# Patient Record
Sex: FEMALE | Race: OTHER | NOT HISPANIC OR LATINO
[De-identification: names, ages, dates, MRNs, and addresses within clinical notes are randomized per-mention and may not be internally consistent; named-entity substitution may affect disease eponyms.]

---

## 2019-06-25 ENCOUNTER — APPOINTMENT (OUTPATIENT)
Dept: OBGYN | Facility: CLINIC | Age: 44
End: 2019-06-25
Payer: COMMERCIAL

## 2019-06-25 VITALS
DIASTOLIC BLOOD PRESSURE: 80 MMHG | HEIGHT: 62 IN | SYSTOLIC BLOOD PRESSURE: 116 MMHG | WEIGHT: 155 LBS | BODY MASS INDEX: 28.52 KG/M2

## 2019-06-25 DIAGNOSIS — Z87.09 PERSONAL HISTORY OF OTHER DISEASES OF THE RESPIRATORY SYSTEM: ICD-10-CM

## 2019-06-25 DIAGNOSIS — O09.529 SUPERVISION OF ELDERLY MULTIGRAVIDA, UNSPECIFIED TRIMESTER: ICD-10-CM

## 2019-06-25 DIAGNOSIS — Z00.00 ENCOUNTER FOR GENERAL ADULT MEDICAL EXAMINATION W/OUT ABNORMAL FINDINGS: ICD-10-CM

## 2019-06-25 LAB — HCG UR QL: POSITIVE

## 2019-06-25 PROCEDURE — 76830 TRANSVAGINAL US NON-OB: CPT

## 2019-06-25 PROCEDURE — 99203 OFFICE O/P NEW LOW 30 MIN: CPT | Mod: 25

## 2019-06-25 PROCEDURE — 81025 URINE PREGNANCY TEST: CPT

## 2019-06-25 NOTE — PHYSICAL EXAM
[Awake] : awake [Alert] : alert [Soft] : soft [Oriented x3] : oriented to person, place, and time [Normal] : cervix [No Bleeding] : there was no active vaginal bleeding [Anteversion] : anteverted [Uterine Adnexae] : were not tender and not enlarged [Enlarged ___ wks] : enlarged [unfilled] ~Uweeks [Acute Distress] : no acute distress [Nipple Discharge] : no nipple discharge [Mass] : no breast mass [Axillary LAD] : no axillary lymphadenopathy [Tender] : non tender [Tenderness] : nontender

## 2019-07-09 ENCOUNTER — APPOINTMENT (OUTPATIENT)
Dept: OBGYN | Facility: CLINIC | Age: 44
End: 2019-07-09
Payer: COMMERCIAL

## 2019-07-17 ENCOUNTER — APPOINTMENT (OUTPATIENT)
Dept: OBGYN | Facility: CLINIC | Age: 44
End: 2019-07-17
Payer: COMMERCIAL

## 2019-07-17 VITALS — SYSTOLIC BLOOD PRESSURE: 118 MMHG | BODY MASS INDEX: 28.53 KG/M2 | WEIGHT: 156 LBS | DIASTOLIC BLOOD PRESSURE: 74 MMHG

## 2019-07-17 DIAGNOSIS — N94.6 DYSMENORRHEA, UNSPECIFIED: ICD-10-CM

## 2019-07-17 DIAGNOSIS — Z30.09 ENCOUNTER FOR OTHER GENERAL COUNSELING AND ADVICE ON CONTRACEPTION: ICD-10-CM

## 2019-07-17 DIAGNOSIS — N92.0 EXCESSIVE AND FREQUENT MENSTRUATION WITH REGULAR CYCLE: ICD-10-CM

## 2019-07-17 PROCEDURE — 99214 OFFICE O/P EST MOD 30 MIN: CPT | Mod: 25

## 2019-07-17 PROCEDURE — 76830 TRANSVAGINAL US NON-OB: CPT

## 2019-07-17 NOTE — PROCEDURE
[Amenorrhea] : Amenorrhea [Transvaginal Ultrasound] : transvaginal ultrasound [Present] : uterus present [Anteverted] : anteverted [L: ___ cm] : L: [unfilled] cm [W: ___cm] : W: [unfilled] cm [FreeTextEntry3] : Followup to ETOP endometrial appears to have scant amount of fluid present [FreeTextEntry7] : 2.2x3.6cm [FreeTextEntry8] : 2.2x3.1cm [FreeTextEntry4] : Normal appearing transvaginal sono status post ETOP

## 2019-07-25 ENCOUNTER — RX RENEWAL (OUTPATIENT)
Age: 44
End: 2019-07-25

## 2019-07-25 RX ORDER — LEVONORGESTREL 52 MG/1
20 INTRAUTERINE DEVICE INTRAUTERINE
Qty: 1 | Refills: 0 | Status: ACTIVE | COMMUNITY
Start: 2019-07-25 | End: 1900-01-01

## 2019-08-15 ENCOUNTER — APPOINTMENT (OUTPATIENT)
Dept: OBGYN | Facility: CLINIC | Age: 44
End: 2019-08-15
Payer: COMMERCIAL

## 2019-08-15 VITALS — BODY MASS INDEX: 29.26 KG/M2 | WEIGHT: 160 LBS | SYSTOLIC BLOOD PRESSURE: 120 MMHG | DIASTOLIC BLOOD PRESSURE: 72 MMHG

## 2019-08-15 DIAGNOSIS — Z87.42 PERSONAL HISTORY OF OTHER DISEASES OF THE FEMALE GENITAL TRACT: ICD-10-CM

## 2019-08-15 DIAGNOSIS — Z32.01 ENCOUNTER FOR PREGNANCY TEST, RESULT POSITIVE: ICD-10-CM

## 2019-08-15 DIAGNOSIS — N92.6 IRREGULAR MENSTRUATION, UNSPECIFIED: ICD-10-CM

## 2019-08-15 LAB — HCG UR QL: POSITIVE

## 2019-08-15 PROCEDURE — 76830 TRANSVAGINAL US NON-OB: CPT

## 2019-08-15 PROCEDURE — 81025 URINE PREGNANCY TEST: CPT

## 2019-08-15 PROCEDURE — 99214 OFFICE O/P EST MOD 30 MIN: CPT | Mod: 25

## 2019-08-15 NOTE — PROCEDURE
[Abnormal Uterine Bleeding] : abnormal uterine bleeding [Threatened Miscarriage] : threatened miscarriage [Transvaginal Ultrasound] : transvaginal ultrasound [Anteverted] : anteverted [Present] : uterus present [L: ___ cm] : L: [unfilled] cm [FreeTextEntry5] : Findings noted approximately 1.5 cm with a small central hypolucency [W: ___cm] : W: [unfilled] cm [FreeTextEntry7] : 2.0 x 3.6 cm [FreeTextEntry8] : 1.9 x 3.4 cm [FreeTextEntry4] : In view of slight positive urine pregnancy test cannot rule out early pregnancy, versus retained products of conception, versus H.mole.

## 2019-09-04 ENCOUNTER — APPOINTMENT (OUTPATIENT)
Dept: OBGYN | Facility: CLINIC | Age: 44
End: 2019-09-04
Payer: COMMERCIAL

## 2019-09-04 VITALS — BODY MASS INDEX: 29.44 KG/M2 | WEIGHT: 160 LBS | HEIGHT: 62 IN

## 2019-09-04 DIAGNOSIS — Z32.01 ENCOUNTER FOR PREGNANCY TEST, RESULT POSITIVE: ICD-10-CM

## 2019-09-04 PROCEDURE — 99214 OFFICE O/P EST MOD 30 MIN: CPT

## 2024-12-21 ENCOUNTER — EMERGENCY (EMERGENCY)
Facility: HOSPITAL | Age: 49
LOS: 0 days | Discharge: HOME | End: 2024-12-21
Attending: STUDENT IN AN ORGANIZED HEALTH CARE EDUCATION/TRAINING PROGRAM
Payer: COMMERCIAL

## 2024-12-21 VITALS
HEART RATE: 63 BPM | DIASTOLIC BLOOD PRESSURE: 55 MMHG | WEIGHT: 154.98 LBS | OXYGEN SATURATION: 99 % | RESPIRATION RATE: 18 BRPM | SYSTOLIC BLOOD PRESSURE: 90 MMHG | TEMPERATURE: 98 F

## 2024-12-21 VITALS — SYSTOLIC BLOOD PRESSURE: 102 MMHG | DIASTOLIC BLOOD PRESSURE: 56 MMHG | TEMPERATURE: 98 F | HEART RATE: 81 BPM

## 2024-12-21 DIAGNOSIS — J45.909 UNSPECIFIED ASTHMA, UNCOMPLICATED: ICD-10-CM
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
## 2024-12-21 DIAGNOSIS — R11.0 NAUSEA: ICD-10-CM

## 2024-12-21 DIAGNOSIS — S01.81XA LACERATION WITHOUT FOREIGN BODY OF OTHER PART OF HEAD, INITIAL ENCOUNTER: ICD-10-CM

## 2024-12-21 DIAGNOSIS — R42 DIZZINESS AND GIDDINESS: ICD-10-CM
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
## 2024-12-21 LAB
ANION GAP SERPL CALC-SCNC: 10 MMOL/L — SIGNIFICANT CHANGE UP (ref 7–14)
BASOPHILS # BLD AUTO: 0.04 K/UL — SIGNIFICANT CHANGE UP (ref 0–0.2)
BASOPHILS NFR BLD AUTO: 0.6 % — SIGNIFICANT CHANGE UP (ref 0–1)
BUN SERPL-MCNC: 8 MG/DL — LOW (ref 10–20)
CALCIUM SERPL-MCNC: 8.9 MG/DL — SIGNIFICANT CHANGE UP (ref 8.4–10.5)
CHLORIDE SERPL-SCNC: 105 MMOL/L — SIGNIFICANT CHANGE UP (ref 98–110)
CO2 SERPL-SCNC: 26 MMOL/L — SIGNIFICANT CHANGE UP (ref 17–32)
CREAT SERPL-MCNC: 0.8 MG/DL — SIGNIFICANT CHANGE UP (ref 0.7–1.5)
EGFR: 91 ML/MIN/1.73M2 — SIGNIFICANT CHANGE UP
EOSINOPHIL # BLD AUTO: 0.17 K/UL — SIGNIFICANT CHANGE UP (ref 0–0.7)
EOSINOPHIL NFR BLD AUTO: 2.4 % — SIGNIFICANT CHANGE UP (ref 0–8)
GLUCOSE SERPL-MCNC: 118 MG/DL — HIGH (ref 70–99)
HCG SERPL QL: NEGATIVE — SIGNIFICANT CHANGE UP
HCT VFR BLD CALC: 38.1 % — SIGNIFICANT CHANGE UP (ref 37–47)
HGB BLD-MCNC: 12.7 G/DL — SIGNIFICANT CHANGE UP (ref 12–16)
IMM GRANULOCYTES NFR BLD AUTO: 0.4 % — HIGH (ref 0.1–0.3)
LYMPHOCYTES # BLD AUTO: 1.51 K/UL — SIGNIFICANT CHANGE UP (ref 1.2–3.4)
LYMPHOCYTES # BLD AUTO: 21.3 % — SIGNIFICANT CHANGE UP (ref 20.5–51.1)
MCHC RBC-ENTMCNC: 28.8 PG — SIGNIFICANT CHANGE UP (ref 27–31)
MCHC RBC-ENTMCNC: 33.3 G/DL — SIGNIFICANT CHANGE UP (ref 32–37)
MCV RBC AUTO: 86.4 FL — SIGNIFICANT CHANGE UP (ref 81–99)
MONOCYTES # BLD AUTO: 0.44 K/UL — SIGNIFICANT CHANGE UP (ref 0.1–0.6)
MONOCYTES NFR BLD AUTO: 6.2 % — SIGNIFICANT CHANGE UP (ref 1.7–9.3)
NEUTROPHILS # BLD AUTO: 4.89 K/UL — SIGNIFICANT CHANGE UP (ref 1.4–6.5)
NEUTROPHILS NFR BLD AUTO: 69.1 % — SIGNIFICANT CHANGE UP (ref 42.2–75.2)
NRBC # BLD: 0 /100 WBCS — SIGNIFICANT CHANGE UP (ref 0–0)
PLATELET # BLD AUTO: 217 K/UL — SIGNIFICANT CHANGE UP (ref 130–400)
PMV BLD: 11.8 FL — HIGH (ref 7.4–10.4)
POTASSIUM SERPL-MCNC: 3.9 MMOL/L — SIGNIFICANT CHANGE UP (ref 3.5–5)
POTASSIUM SERPL-SCNC: 3.9 MMOL/L — SIGNIFICANT CHANGE UP (ref 3.5–5)
RBC # BLD: 4.41 M/UL — SIGNIFICANT CHANGE UP (ref 4.2–5.4)
RBC # FLD: 11.6 % — SIGNIFICANT CHANGE UP (ref 11.5–14.5)
SODIUM SERPL-SCNC: 141 MMOL/L — SIGNIFICANT CHANGE UP (ref 135–146)
WBC # BLD: 7.08 K/UL — SIGNIFICANT CHANGE UP (ref 4.8–10.8)
WBC # FLD AUTO: 7.08 K/UL — SIGNIFICANT CHANGE UP (ref 4.8–10.8)

## 2024-12-21 RX ORDER — ONDANSETRON HYDROCHLORIDE 4 MG/1
4 TABLET, FILM COATED ORAL ONCE
Refills: 0 | Status: COMPLETED | OUTPATIENT
Start: 2024-12-21 | End: 2024-12-21

## 2024-12-21 RX ORDER — SODIUM CHLORIDE 9 MG/ML
1000 INJECTION, SOLUTION INTRAMUSCULAR; INTRAVENOUS; SUBCUTANEOUS ONCE
Refills: 0 | Status: COMPLETED | OUTPATIENT
Start: 2024-12-21 | End: 2024-12-21

## 2024-12-21 RX ORDER — ACETAMINOPHEN 500MG 500 MG/1
650 TABLET, COATED ORAL ONCE
Refills: 0 | Status: COMPLETED | OUTPATIENT
Start: 2024-12-21 | End: 2024-12-21

## 2024-12-21 RX ADMIN — ONDANSETRON HYDROCHLORIDE 4 MILLIGRAM(S): 4 TABLET, FILM COATED ORAL at 08:46

## 2024-12-21 RX ADMIN — SODIUM CHLORIDE 2000 MILLILITER(S): 9 INJECTION, SOLUTION INTRAMUSCULAR; INTRAVENOUS; SUBCUTANEOUS at 08:46

## 2024-12-21 RX ADMIN — ACETAMINOPHEN 500MG 650 MILLIGRAM(S): 500 TABLET, COATED ORAL at 08:47

## 2024-12-21 NOTE — ED PROVIDER NOTE - PATIENT PORTAL LINK FT
You can access the FollowMyHealth Patient Portal offered by [facility] by registering at the following website: [email or web address]. By joining [facility]’s FollowMyHealth portal, you will also be able to view your health information using other applications (apps) compatible with our system.

## 2024-12-21 NOTE — ED PROVIDER NOTE - OBJECTIVE STATEMENT
POt is a 48y/o female no pmhx here for forehead lac from mechanical slip and fall in bathroom when getting out of the shower. Pt denies LOC, weakness, numbness, neck pain, AC use.

## 2024-12-21 NOTE — ED PROVIDER NOTE - ATTENDING APP SHARED VISIT CONTRIBUTION OF CARE
49-year-old female history of mild asthma  Patient for evaluation of fall.  Patient had a shower and stepped on Tylenol.  Patient states she hit her head on the door.   heard the injury and responded immediately.  Patient Dors is mild lightheadedness when she got up and was walking around some nausea now.  No vomiting.  Patient not on antiplatelets or anticoagulants.  No chest pain, neck pain, abdominal pain.  No feeling of fractured tooth. Last tetanus shot was 1 year ago. Patient did not have breakfast today but was eating normally yesterday    vs soft bp  gen- NAD, aaox3  card-rrr  lungs-ctab, no wheezing or rhonchi  abd-sntnd, no guarding or rebound  neuro- full str/sensation, cn ii-xii grossly intact, normal coordination    hent- ~2.5 inch diagonal vertical lac to forehead, FROM to jaw, no orbital wall tenderness, no fractured teeth,  mild R upper lip swelling and small contussion w/o deep wound or bleeding  Spine- no midline c/t/l spine ttp, neck supple, FROM to neck      Plan for screening labs, supportive care with hydration and symptom control, Fingerstick, EKG, CT head, wound repair

## 2024-12-21 NOTE — ED PROVIDER NOTE - CLINICAL SUMMARY MEDICAL DECISION MAKING FREE TEXT BOX
Throughout ED observation period, pt remained clinically   stable.  lightheadedness improving w/ hydration  Lab results as interpreted by me- no anemia, no significant electrolyte abnormalities, normal renal function  cth w/o acute findings  Laceration explored, irrigated and repaired as per procedure note.  Home care and f/u instructions for removal discussed as well as return precautions (increasing pain, redness, pus, swelling, etc)

## 2024-12-21 NOTE — ED PROVIDER NOTE - PHYSICAL EXAMINATION
VITAL SIGNS: I have reviewed nursing notes and confirm.  CONSTITUTIONAL: Well-developed; well-nourished  SKIN: deep 3cm, forehead lac   HEAD: Normocephalic; atraumatic.  EYES: conjunctiva and sclera clear.  ENT: No nasal discharge; airway clear.  EXT: Normal ROM.  No clubbing, cyanosis or edema  NEURO: Alert, oriented, grossly unremarkable

## 2024-12-26 ENCOUNTER — EMERGENCY (EMERGENCY)
Facility: HOSPITAL | Age: 49
LOS: 0 days | Discharge: HOME | End: 2024-12-26
Attending: EMERGENCY MEDICINE
Payer: COMMERCIAL

## 2024-12-26 VITALS
HEART RATE: 76 BPM | DIASTOLIC BLOOD PRESSURE: 77 MMHG | WEIGHT: 154.98 LBS | SYSTOLIC BLOOD PRESSURE: 129 MMHG | RESPIRATION RATE: 18 BRPM | TEMPERATURE: 98 F | OXYGEN SATURATION: 97 %

## 2024-12-26 DIAGNOSIS — S01.81XD LACERATION WITHOUT FOREIGN BODY OF OTHER PART OF HEAD, SUBSEQUENT ENCOUNTER: ICD-10-CM

## 2024-12-26 PROBLEM — Z78.9 OTHER SPECIFIED HEALTH STATUS: Chronic | Status: ACTIVE | Noted: 2024-12-21

## 2024-12-26 PROCEDURE — 99212 OFFICE O/P EST SF 10 MIN: CPT

## 2024-12-26 PROCEDURE — L9995: CPT

## 2024-12-26 NOTE — ED PROVIDER NOTE - PATIENT PORTAL LINK FT
You can access the FollowMyHealth Patient Portal offered by [facility] by registering at the following website: [email or web address]. By joining [facility]’s FollowMyHealth portal, you will also be able to view your health information using other applications (apps) compatible with our system.

## 2024-12-26 NOTE — ED PROVIDER NOTE - CLINICAL SUMMARY MEDICAL DECISION MAKING FREE TEXT BOX
48 yo woman here for suture removal  pt had syncope 1 week prior w/ resultant trauma  states since fall, has had some intermittant dizziness but no HA, difficulty focusing, confusion, change in vision or weakness  no other complaints    Linear vertical lac to forehead. Mild dehisence  no erythema or warmth  no ttp    sutures removed  stable for d/c home

## 2024-12-26 NOTE — ED PROVIDER NOTE - OBJECTIVE STATEMENT
49-year-old female presents Emergency Department for suture removal 5 days ago laceration to forehead occurred after syncope and head injury.  No other symptoms at this time.

## 2024-12-26 NOTE — ED ADULT NURSE NOTE - CINV DISCH TEACH PARTICIP
1. Have you had increased asthma symptoms (chest tightness, increased cough,         wheezing or felt short of breath) in the past week? No    2. Have you had a marked increase in allergy symptoms(itchy eyes or nose, sneezing, runny nose, post nasal drip or throat clearing) in the past week? No    3. Do you have a cold, respiratory tract infection, or flu-like symptoms? No    4. Did you have any problems such as increased allergy or asthma symptoms, hives or generalized itching within 12 hours of receiving your allergy injection or swelling that persisted into the next day? No    5. Are you on any new medications such as eye drops, blood pressure or migraine medication?  No    Please specify: albuterol and ipratropium.    6. Patient was seen by allergist in the last 12 months?  Yes    7. Are you taking your allergy medicine as prescribed? Yes    8. Do you have your Epi kit with you? Yes    9. Epi expiration date: 3/15/22     Staff notes:  Pt is doing well.     Patient

## 2024-12-26 NOTE — ED PROVIDER NOTE - PHYSICAL EXAMINATION
Physical Exam    Vital Signs: I have reviewed the initial vital signs.  Constitutional: appears stated age, no acute distress  Eyes: Conjunctiva pink, Sclera clear,  Integumentary: Sutures intact to forehead no erythema or discharge slight dehiscence to proximal portion of wound  Neurologic: awake, alert,  nvi